# Patient Record
Sex: FEMALE | Race: WHITE
[De-identification: names, ages, dates, MRNs, and addresses within clinical notes are randomized per-mention and may not be internally consistent; named-entity substitution may affect disease eponyms.]

---

## 2019-01-10 ENCOUNTER — INBOUND DOCUMENT (OUTPATIENT)
Age: 34
End: 2019-01-10

## 2019-01-10 PROBLEM — Z00.00 ENCOUNTER FOR PREVENTIVE HEALTH EXAMINATION: Status: ACTIVE | Noted: 2019-01-10

## 2019-01-28 ENCOUNTER — APPOINTMENT (OUTPATIENT)
Dept: OTOLARYNGOLOGY | Facility: CLINIC | Age: 34
End: 2019-01-28
Payer: MEDICAID

## 2019-01-28 VITALS
DIASTOLIC BLOOD PRESSURE: 80 MMHG | BODY MASS INDEX: 24.19 KG/M2 | HEART RATE: 82 BPM | HEIGHT: 59 IN | WEIGHT: 120 LBS | SYSTOLIC BLOOD PRESSURE: 111 MMHG

## 2019-01-28 DIAGNOSIS — R04.2 HEMOPTYSIS: ICD-10-CM

## 2019-01-28 DIAGNOSIS — Z87.39 PERSONAL HISTORY OF OTHER DISEASES OF THE MUSCULOSKELETAL SYSTEM AND CONNECTIVE TISSUE: ICD-10-CM

## 2019-01-28 DIAGNOSIS — Z80.9 FAMILY HISTORY OF MALIGNANT NEOPLASM, UNSPECIFIED: ICD-10-CM

## 2019-01-28 DIAGNOSIS — J35.1 HYPERTROPHY OF TONSILS: ICD-10-CM

## 2019-01-28 DIAGNOSIS — K50.10 CROHN'S DISEASE OF LARGE INTESTINE W/OUT COMPLICATIONS: ICD-10-CM

## 2019-01-28 PROCEDURE — 31575 DIAGNOSTIC LARYNGOSCOPY: CPT

## 2019-01-28 PROCEDURE — 99204 OFFICE O/P NEW MOD 45 MIN: CPT | Mod: 25

## 2019-02-01 PROBLEM — R04.2 HEMOPTYSIS: Status: ACTIVE | Noted: 2019-02-01

## 2019-02-01 PROBLEM — J35.1 LINGUAL TONSIL HYPERTROPHY: Status: ACTIVE | Noted: 2019-02-01

## 2019-02-01 NOTE — PROCEDURE
[None] : none [Flexible Endoscope] : examined with the flexible endoscope [Serial Number: ___] : Serial Number: [unfilled] [de-identified] : No lesions in the NPx, OPx, HPx or larynx.  VC are mobile, airway patent.  Patient with prominent but symmetric lingual tonsils, no evidence of bleeding or crusting.  No evidence of any significant submucosal fullness. [de-identified] : Hemoptysis

## 2019-02-01 NOTE — CONSULT LETTER
[Dear  ___] : Dear  [unfilled], [Consult Letter:] : I had the pleasure of evaluating your patient, [unfilled]. [Please see my note below.] : Please see my note below. [Consult Closing:] : Thank you very much for allowing me to participate in the care of this patient.  If you have any questions, please do not hesitate to contact me. [Sincerely,] : Sincerely, [FreeTextEntry2] : Dr. Ramirez Rivera\par [address]\[place] [address] [FreeTextEntry3] : Dev

## 2019-02-01 NOTE — PHYSICAL EXAM
[Laryngoscopy Performed] : laryngoscopy was performed, see procedure section for findings [FreeTextEntry1] : No concerning lesions in the OC/OPx on inspection or palpation.\par  [Normal] : no rashes

## 2019-02-01 NOTE — HISTORY OF PRESENT ILLNESS
[de-identified] : Referred by Dr. Rivera for lingual tonsil hypertrophy. Cough for last few years ago.  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 Hemoptysis for a few months.  Hoarse voice. Odynophagia.  MRI done on 1/9/19 showed prominence of the lingual tonsils.  She reports that she also had a CT Neck at [facility] when the hemoptysis first started.  She denies any dyspnea, dysphagia or otalgia.  She reports that she has had significant weight loss in this time period.  She also has a history of Crohn's disease.

## 2019-04-22 ENCOUNTER — APPOINTMENT (OUTPATIENT)
Dept: OTOLARYNGOLOGY | Facility: CLINIC | Age: 34
End: 2019-04-22

## 2021-08-31 ENCOUNTER — APPOINTMENT (OUTPATIENT)
Dept: OBGYN | Facility: CLINIC | Age: 36
End: 2021-08-31
Payer: COMMERCIAL

## 2021-08-31 ENCOUNTER — NON-APPOINTMENT (OUTPATIENT)
Age: 36
End: 2021-08-31

## 2021-08-31 VITALS
SYSTOLIC BLOOD PRESSURE: 110 MMHG | WEIGHT: 130 LBS | HEIGHT: 59 IN | DIASTOLIC BLOOD PRESSURE: 70 MMHG | BODY MASS INDEX: 26.21 KG/M2

## 2021-08-31 DIAGNOSIS — N83.201 UNSPECIFIED OVARIAN CYST, RIGHT SIDE: ICD-10-CM

## 2021-08-31 DIAGNOSIS — N91.5 OLIGOMENORRHEA, UNSPECIFIED: ICD-10-CM

## 2021-08-31 DIAGNOSIS — Z01.419 ENCOUNTER FOR GYNECOLOGICAL EXAMINATION (GENERAL) (ROUTINE) W/OUT ABNORMAL FINDINGS: ICD-10-CM

## 2021-08-31 PROCEDURE — 99395 PREV VISIT EST AGE 18-39: CPT

## 2021-08-31 NOTE — HISTORY OF PRESENT ILLNESS
[FreeTextEntry1] : 35 yo had tubal in 2010 , had hot flashes and hairs on chin, said sj menopausal at 31,  she has had irreg menses for yrs but has 3 daughters. [facility]. [facility]  and was told she had a cyst. .  SHe has Crohns, RA, LUpus.Pt was given cipro and is feeling better  today .  [Men] : men [Vaginal] : vaginal [No] : No

## 2021-09-02 LAB
C TRACH RRNA SPEC QL NAA+PROBE: NOT DETECTED
HPV HIGH+LOW RISK DNA PNL CVX: NOT DETECTED
N GONORRHOEA RRNA SPEC QL NAA+PROBE: NOT DETECTED
SOURCE TP AMPLIFICATION: NORMAL

## 2021-09-07 LAB — CYTOLOGY CVX/VAG DOC THIN PREP: NORMAL

## 2021-10-19 ENCOUNTER — APPOINTMENT (OUTPATIENT)
Dept: ANTEPARTUM | Facility: CLINIC | Age: 36
End: 2021-10-19

## 2021-10-19 ENCOUNTER — APPOINTMENT (OUTPATIENT)
Dept: OBGYN | Facility: CLINIC | Age: 36
End: 2021-10-19